# Patient Record
Sex: FEMALE | Race: WHITE | Employment: FULL TIME | URBAN - METROPOLITAN AREA
[De-identification: names, ages, dates, MRNs, and addresses within clinical notes are randomized per-mention and may not be internally consistent; named-entity substitution may affect disease eponyms.]

---

## 2017-03-03 PROBLEM — N39.3 STRESS INCONTINENCE: Status: ACTIVE | Noted: 2017-03-03

## 2019-01-29 PROBLEM — Z80.3 FAMILY HISTORY OF BREAST CANCER IN SISTER: Status: ACTIVE | Noted: 2019-01-29

## 2019-03-09 PROCEDURE — 86803 HEPATITIS C AB TEST: CPT | Performed by: INTERNAL MEDICINE

## 2019-03-10 PROBLEM — E78.5 HYPERLIPIDEMIA: Status: ACTIVE | Noted: 2019-03-10

## 2019-03-10 PROBLEM — R73.01 ELEVATED FASTING GLUCOSE: Status: ACTIVE | Noted: 2019-03-10

## 2019-04-25 ENCOUNTER — HOSPITAL ENCOUNTER (OUTPATIENT)
Facility: HOSPITAL | Age: 64
Setting detail: HOSPITAL OUTPATIENT SURGERY
Discharge: HOME | End: 2019-04-25
Attending: INTERNAL MEDICINE | Admitting: INTERNAL MEDICINE
Payer: COMMERCIAL

## 2019-04-25 VITALS
OXYGEN SATURATION: 100 % | TEMPERATURE: 98 F | HEIGHT: 64.5 IN | SYSTOLIC BLOOD PRESSURE: 107 MMHG | RESPIRATION RATE: 17 BRPM | HEART RATE: 61 BPM | DIASTOLIC BLOOD PRESSURE: 68 MMHG | BODY MASS INDEX: 29.51 KG/M2 | WEIGHT: 175 LBS

## 2019-04-25 DIAGNOSIS — Z12.11 SPECIAL SCREENING FOR MALIGNANT NEOPLASMS, COLON: ICD-10-CM

## 2019-04-25 PROCEDURE — 0DBN8ZX EXCISION OF SIGMOID COLON, VIA NATURAL OR ARTIFICIAL OPENING ENDOSCOPIC, DIAGNOSTIC: ICD-10-PCS | Performed by: INTERNAL MEDICINE

## 2019-04-25 PROCEDURE — 99152 MOD SED SAME PHYS/QHP 5/>YRS: CPT | Performed by: INTERNAL MEDICINE

## 2019-04-25 PROCEDURE — 99153 MOD SED SAME PHYS/QHP EA: CPT | Performed by: INTERNAL MEDICINE

## 2019-04-25 PROCEDURE — 88305 TISSUE EXAM BY PATHOLOGIST: CPT | Performed by: INTERNAL MEDICINE

## 2019-04-25 RX ORDER — MIDAZOLAM HYDROCHLORIDE 1 MG/ML
INJECTION INTRAMUSCULAR; INTRAVENOUS
Status: DISCONTINUED | OUTPATIENT
Start: 2019-04-25 | End: 2019-04-25

## 2019-04-25 RX ORDER — SODIUM CHLORIDE, SODIUM LACTATE, POTASSIUM CHLORIDE, CALCIUM CHLORIDE 600; 310; 30; 20 MG/100ML; MG/100ML; MG/100ML; MG/100ML
INJECTION, SOLUTION INTRAVENOUS CONTINUOUS
Status: DISCONTINUED | OUTPATIENT
Start: 2019-04-25 | End: 2019-04-25

## 2019-04-25 NOTE — H&P
GI PRE-OP H&P    CC: Screening for colorectal cancer    HPI:  Eyad Montesinos is a 66year old female who is here for a routine colorectal cancer screening evaluation.  She denies BRBPR or melena, diarrhea, constipation or abdominal pain on a persistent b inspiration  CARDIO: regular rate and rhythm with no palpable heave or thrill  GI: positive bowel sounds, soft, non-tender and non-distended    ASA Class: II  Mallampati score: II    Impression:  1.  Screening for colorectal cancer    66year old female her

## 2019-04-25 NOTE — OPERATIVE REPORT
[facility] GI Procedure Report    PATIENT NAME: Katelyn Larry  : 12/3/1955  MRN: [id number]  DATE OF OPERATION: 2019    PROCEDURE PERFORMED: Colonoscopy with biopsies, moderate sedation    SURGEON: Tegan Quach MD     INDICATIONS: otherwise normal appearing throughout the colon and rectum. 5. On retroflexion in the rectum, there were small internal hemorrhoids and no masses. IMPRESSION:   1. Colon polyps x 2  2. Diverticulosis  3. Internal hemorrhoids    RECOMMENDATIONS:  1.  Fo

## 2019-04-25 NOTE — BRIEF OP NOTE
Pre-Operative Diagnosis: Special screening for malignant neoplasms, colon [Z12.11]     Post-Operative Diagnosis: Polyps, Diverticulosis, Hemorrhoids      Procedure Performed:   Procedure(s):  colonoscopy   With forcep polypectomy    Surgeon(s) and Role:

## 2019-05-03 NOTE — PROGRESS NOTES
05/03/19      [address] [id number]      Dear Ms Lexus Luevano recently underwent a colonoscopy with removal of two polyps. Your pathology shows that the polyps are hyperplastic.   This type of polyp is not pre-cancero

## 2019-10-25 ENCOUNTER — WALK IN (OUTPATIENT)
Dept: URGENT CARE | Age: 64
End: 2019-10-25

## 2019-10-25 VITALS
RESPIRATION RATE: 16 BRPM | SYSTOLIC BLOOD PRESSURE: 118 MMHG | HEART RATE: 69 BPM | OXYGEN SATURATION: 97 % | TEMPERATURE: 97.6 F | DIASTOLIC BLOOD PRESSURE: 70 MMHG

## 2019-10-25 DIAGNOSIS — J06.9 ACUTE UPPER RESPIRATORY INFECTION, UNSPECIFIED: Primary | ICD-10-CM

## 2019-10-25 DIAGNOSIS — J01.90 ACUTE SINUSITIS, RECURRENCE NOT SPECIFIED, UNSPECIFIED LOCATION: ICD-10-CM

## 2019-10-25 PROCEDURE — 99204 OFFICE O/P NEW MOD 45 MIN: CPT | Performed by: INTERNAL MEDICINE

## 2019-10-25 RX ORDER — CEFDINIR 300 MG/1
300 CAPSULE ORAL 2 TIMES DAILY
Qty: 20 CAPSULE | Refills: 0 | Status: SHIPPED | OUTPATIENT
Start: 2019-10-25 | End: 2019-11-04

## 2019-10-25 RX ORDER — BENZONATATE 200 MG/1
200 CAPSULE ORAL 3 TIMES DAILY PRN
Qty: 30 CAPSULE | Refills: 0 | Status: SHIPPED | OUTPATIENT
Start: 2019-10-25 | End: 2019-11-04

## 2021-10-23 ENCOUNTER — WALK IN (OUTPATIENT)
Dept: URGENT CARE | Age: 66
End: 2021-10-23

## 2021-10-23 VITALS
RESPIRATION RATE: 16 BRPM | DIASTOLIC BLOOD PRESSURE: 84 MMHG | HEART RATE: 70 BPM | SYSTOLIC BLOOD PRESSURE: 121 MMHG | TEMPERATURE: 97.4 F | OXYGEN SATURATION: 96 %

## 2021-10-23 DIAGNOSIS — L08.9 FINGER INFECTION: Primary | ICD-10-CM

## 2021-10-23 PROCEDURE — 99214 OFFICE O/P EST MOD 30 MIN: CPT | Performed by: INTERNAL MEDICINE

## 2021-10-23 RX ORDER — AMOXICILLIN AND CLAVULANATE POTASSIUM 875; 125 MG/1; MG/1
1 TABLET, FILM COATED ORAL EVERY 12 HOURS
Qty: 20 TABLET | Refills: 0 | Status: SHIPPED | OUTPATIENT
Start: 2021-10-23

## 2021-10-23 RX ORDER — ATORVASTATIN CALCIUM 10 MG/1
TABLET, FILM COATED ORAL
COMMUNITY
Start: 2021-08-21

## (undated) DEVICE — FILTERLINE NASAL ADULT O2/CO2

## (undated) DEVICE — Device: Brand: DEFENDO AIR/WATER/SUCTION AND BIOPSY VALVE

## (undated) DEVICE — FORCEP BIOPSY RJ4 LG CAP W/ND

## (undated) DEVICE — 1200CC GUARDIAN II: Brand: GUARDIAN

## (undated) DEVICE — ENDOSCOPY PACK - LOWER: Brand: MEDLINE INDUSTRIES, INC.

## (undated) DEVICE — 3M™ RED DOT™ MONITORING ELECTRODE WITH FOAM TAPE AND STICKY GEL, 50/BAG, 20/CASE, 72/PLT 2570: Brand: RED DOT™

## (undated) NOTE — LETTER
05/03/19          [address] [id number]      Dear Ms Anthony Villatoro recently underwent a colonoscopy with removal of two polyps. Your pathology shows that the polyps are hyperplastic.   This type of polyp is not pre